# Patient Record
(demographics unavailable — no encounter records)

---

## 2024-12-16 NOTE — IMAGING
[de-identified] : TTP midline spine and paraspinal musculature  Strength                                          Hip flexor   Right: 5/5; Left: 5/5                              Knee extensor     Right: 5/5; Left: 5/5                      Ankle dorsiflexion   Right: 5/5; Left: 5/5                   EHL           Right: 5/5; Left: 5/5                                 Ankle plantarflexion       Right: 5/5; Left: 5/5  Sensation L1   Right: 2/2; Left: 2/2 L2   Right: 2/2; Left: 2/2 L3   Right: 2/2; Left: 2/2 L4   Right: 2/2; Left: 2/2 L5   Right: 2/2; Left: 2/2 S1   Right: 2/2; Left: 2/2  Reflexes Patella   Right: 2+; Left 2+ Achilles   Right: 2+; Left 2+ Clonus  Right: absent; L: absent

## 2024-12-16 NOTE — HISTORY OF PRESENT ILLNESS
[de-identified] : 23 old female presents with low back pain.  It does not radiate down her leg.  Is been going on about a month.  No loss of bladder or bowel.  She has not done physical therapy.  Is worse when sitting.  It does not wake her up from sleep.

## 2024-12-16 NOTE — DATA REVIEWED
Annual exam ages 40-64      Madie Tolentino is a ,  41 y.o. female   Patient's last menstrual period was 2024 (exact date).    She presents for her annual checkup.     She is having no problems.      Menstrual status:    Her periods are moderate in flow. She is using three to five pads or tampons per day, usually regular and last 26-30 days.    She does not have dysmenorrhea.    She reports no premenstrual symptoms.    Contraception:    The current method of family planning is abstinence.    Hormonal status:  She reports no perimenstrual type symptoms.   She is not having vasomotor symptoms.  The patient is not using any ERT.    Sexual history:    She  reports that she is not currently sexually active and has had partner(s) who are male. She reports using the following method of birth control/protection: None.    Medical conditions:    Since her last annual GYN exam about 5 years ago, she has not the following changes in her health history: none.     Surgical history confirmed with patient.  has no past surgical history on file.    Pap and Mammogram History:    Her most recent Pap smear was normal, obtained 5 year(s) ago.    The patient     Breast Cancer History/Substance Abuse: negative      Osteoporosis History:    Family history does not include a first or second degree relative with osteopenia or osteoporosis.      History reviewed. No pertinent past medical history.  History reviewed. No pertinent surgical history.    No current outpatient medications on file.     No current facility-administered medications for this visit.     Allergies: Patient has no known allergies.     Tobacco History:  reports that she has never smoked. She has never used smokeless tobacco.  Alcohol Abuse:  reports no history of alcohol use.  Drug Abuse:  reports no history of drug use.    Family Medical/Cancer History: History reviewed. No pertinent family history.     Review of Systems - History obtained from the  [FreeTextEntry1] : Obtained and reviewed AP lateral flexion-extension lumbar x-rays.  No stability no fractures no dislocations.

## 2024-12-16 NOTE — DISCUSSION/SUMMARY
[de-identified] : 23-year-old female presents with lumbar sprain.  Anti-inflammatories.  Physical therapy.  Follow-up as needed.